# Patient Record
Sex: FEMALE | Race: WHITE | NOT HISPANIC OR LATINO | Employment: OTHER
[De-identification: names, ages, dates, MRNs, and addresses within clinical notes are randomized per-mention and may not be internally consistent; named-entity substitution may affect disease eponyms.]

---

## 2019-12-03 ENCOUNTER — TRANSCRIBE ORDERS (OUTPATIENT)
Dept: OTHER | Age: 84
End: 2019-12-03

## 2019-12-03 DIAGNOSIS — Z91.81 PERSONAL HISTORY OF FALL: ICD-10-CM

## 2019-12-03 DIAGNOSIS — R26.89 IMPAIRMENT OF BALANCE: Primary | ICD-10-CM

## 2019-12-04 NOTE — TELEPHONE ENCOUNTER
RECORDS RECEIVED FROM: External - Dr Ed Tyson    DATE RECEIVED: 1/15/20   NOTES (FOR ALL VISITS) STATUS DETAILS   OFFICE NOTE from referring provider Care Everywhere 11/25/19  11/15/19   OFFICE NOTE from other specialist N/A    DISCHARGE SUMMARY from hospital Care Everywhere [facility]:  10/17/18-10/19/18   DISCHARGE REPORT from the ER N/A    OPERATIVE REPORT N/A    MEDICATION LIST Care Everywhere    IMAGING  (FOR ALL VISITS)     EMG N/A    EEG N/A    ECT N/A    MRI (HEAD, NECK, SPINE) Received [facility]:  MRI Head 11/20/19  MRA Head 10/18/18  MRI Head 10/18/18  MRI Head 10/11/18   LUMBAR PUNCTURE N/A    BABAK Scan N/A    CT (HEAD, NECK, SPINE) Received [facility]:  CT Head 7/3/18      Action 12/4/19   Action Taken Imaging request faxed to [facility] for:  MRI Head 11/20/19  MRA Head 10/18/18  MRI Head 10/18/18  MRI Head 10/11/18  CT Head 7/3/18

## 2019-12-10 NOTE — TELEPHONE ENCOUNTER
Imaging Received  12/10/19  [facility]   Image Type (x): Disc:   PACS: x   Exam Date/Name MRI Head 11/20/19  MRA Head 10/18/18  MRI Head 10/18/18  MRI Head 10/11/18  CT Head 7/3/18 Comments: image resolved in PACS

## 2020-01-12 ASSESSMENT — ENCOUNTER SYMPTOMS
NIGHT SWEATS: 0
BLOOD IN STOOL: 0
SPEECH CHANGE: 0
JAUNDICE: 0
LIGHT-HEADEDNESS: 1
POLYPHAGIA: 1
DOUBLE VISION: 1
BOWEL INCONTINENCE: 0
WEIGHT LOSS: 0
CHILLS: 1
HEARTBURN: 1
TASTE DISTURBANCE: 0
NECK PAIN: 0
VOMITING: 0
NAUSEA: 0
TROUBLE SWALLOWING: 1
NUMBNESS: 0
SORE THROAT: 0
SINUS CONGESTION: 0
HEMATURIA: 0
SEIZURES: 0
SYNCOPE: 0
TREMORS: 0
LOSS OF CONSCIOUSNESS: 0
NECK MASS: 0
EYE WATERING: 0
SLEEP DISTURBANCES DUE TO BREATHING: 0
POLYDIPSIA: 1
EYE REDNESS: 0
ABDOMINAL PAIN: 0
LEG PAIN: 1
WEIGHT GAIN: 0
ORTHOPNEA: 0
HEADACHES: 1
HYPERTENSION: 1
PARALYSIS: 0
JOINT SWELLING: 1
PALPITATIONS: 0
WEAKNESS: 1
SINUS PAIN: 0
DIFFICULTY URINATING: 0
ALTERED TEMPERATURE REGULATION: 0
FEVER: 0
STIFFNESS: 1
HYPOTENSION: 0
BLOATING: 0
RECTAL PAIN: 0
DYSURIA: 0
CONSTIPATION: 0
MUSCLE CRAMPS: 0
FATIGUE: 1
INCREASED ENERGY: 0
DECREASED APPETITE: 0
ARTHRALGIAS: 1
HOARSE VOICE: 1
FLANK PAIN: 0
BACK PAIN: 1
DIARRHEA: 0
EYE IRRITATION: 1
DIZZINESS: 1
HALLUCINATIONS: 0
MEMORY LOSS: 0
EXERCISE INTOLERANCE: 0
MYALGIAS: 1
EYE PAIN: 0
DISTURBANCES IN COORDINATION: 0
SMELL DISTURBANCE: 0
MUSCLE WEAKNESS: 1
TINGLING: 0

## 2020-01-15 ENCOUNTER — OFFICE VISIT (OUTPATIENT)
Dept: NEUROLOGY | Facility: CLINIC | Age: 85
End: 2020-01-15
Attending: PSYCHIATRY & NEUROLOGY
Payer: MEDICARE

## 2020-01-15 ENCOUNTER — PRE VISIT (OUTPATIENT)
Dept: NEUROLOGY | Facility: CLINIC | Age: 85
End: 2020-01-15

## 2020-01-15 VITALS — SYSTOLIC BLOOD PRESSURE: 166 MMHG | DIASTOLIC BLOOD PRESSURE: 92 MMHG | WEIGHT: 129.6 LBS | HEART RATE: 73 BPM

## 2020-01-15 DIAGNOSIS — G20.A1 PARKINSON DISEASE (H): Primary | ICD-10-CM

## 2020-01-15 RX ORDER — LISINOPRIL 30 MG/1
TABLET ORAL DAILY
COMMUNITY

## 2020-01-15 RX ORDER — VENLAFAXINE HYDROCHLORIDE 150 MG/1
150 CAPSULE, EXTENDED RELEASE ORAL DAILY
COMMUNITY
Start: 2016-10-14

## 2020-01-15 RX ORDER — LEVOTHYROXINE SODIUM 75 UG/1
75 TABLET ORAL DAILY
COMMUNITY
Start: 2019-09-30

## 2020-01-15 RX ORDER — PANTOPRAZOLE SODIUM 40 MG/1
TABLET, DELAYED RELEASE ORAL DAILY
COMMUNITY
Start: 2019-12-26

## 2020-01-15 RX ORDER — CARBIDOPA AND LEVODOPA 25; 100 MG/1; MG/1
1.5 TABLET ORAL 3 TIMES DAILY
Qty: 150 TABLET | Refills: 3 | Status: SHIPPED | OUTPATIENT
Start: 2020-01-15 | End: 2020-01-28 | Stop reason: SINTOL

## 2020-01-15 RX ORDER — SIMVASTATIN 40 MG
60 TABLET ORAL AT BEDTIME
COMMUNITY
Start: 2016-10-14

## 2020-01-15 ASSESSMENT — UNIFIED PARKINSONS DISEASE RATING SCALE (UPDRS)
TOTAL_SCORE: 7
SPONTANEITY_OF_MOVEMENT: 1: SLIGHT: SLIGHT GLOBAL SLOWNESS AND POVERTY OF SPONTANEOUS MOVEMENTS.
POSTURE: 2 MILD.  DEFINITE FLEXION, SCOLIOSIS OR LEANING OT ONE SIDE, BUT CAN CORRECT POSTURE TO NORMAL WHEN ASKED.
AMPLITUDE_RLE: NORMAL: NO TREMOR.
AMPLITUDE_LUE: NORMAL: NO TREMOR.
RIGIDITY_NECK: NORMAL
AMPLITUDE_RUE: NORMAL: NO TREMOR.
RIGIDITY_LUE: MILD: RIGIDITY DETECTED WITHOUT THE ACTIVATION MANEUVER.  FULL RANGE OF MOTION IS EASILY ACHIEVED.
GAIT: SLIGHT: INDEPENDENT WALKING WITH MINOR GAIT IMPAIRMENT.
AXIAL_SCORE: 12
FINGER_TAPPING_RIGHT: SLIGHT: ANY OF THE FOLLOWING: A) THE REGULAR RHYTHM IS BROKEN WITH ONE WITH ONE OR TWO INTERRUPTIONS OR HESITATIONS OF THE MOVEMENT B) SLIGHT SLOWING C) THE AMPLITUDE DECREMENTS NEAR THE END OF THE 10 MOVEMENTS.
SPEECH: SLIGHT: LOSS OF MODULATION, DICTION OR VOLUME, BUT STILL ALL WORDS EASY TO UNDERSTAND.
FREEZING_GAIT: SLIGHT:  FREEZES ON STARTING, TURNING, OR WALKING THROUGH DOOWAY WITH A SINGLE HALT DURING ANY OF THESE EVENTS, BUT THEN CONTINUES SMOOTHLY WITHOUT FREEZING DURING STRAIGHT WALKING.
PARKINSONS_MEDS: OFF
CONSTANCY_TREMOR_ATREST: NORMAL: NO TREMOR.
HANDMOVEMENTS_LEFT: SLIGHT: ANY OF THE FOLLOWING: A) THE REGULAR RHYTHM IS BROKEN WITH ONE WITH ONE OR TWO INTERRUPTIONS OR HESITATIONS OF THE MOVEMENT B) SLIGHT SLOWING C) THE AMPLITUDE DECREMENTS NEAR THE END OF THE 10 MOVEMENTS.
PRONATION_SUPINATION_RIGHT: SLIGHT: ANY OF THE FOLLOWING: A) THE REGULAR RHYTHM IS BROKEN WITH ONE WITH ONE OR TWO INTERRUPTIONS OR HESITATIONS OF THE MOVEMENT B) SLIGHT SLOWING C) THE AMPLITUDE DECREMENTS NEAR THE END OF THE 10 MOVEMENTS.
RIGIDITY_LLE: NORMAL
FINGER_TAPPING_LEFT: SLIGHT: ANY OF THE FOLLOWING: A) THE REGULAR RHYTHM IS BROKEN WITH ONE WITH ONE OR TWO INTERRUPTIONS OR HESITATIONS OF THE MOVEMENT B) SLIGHT SLOWING C) THE AMPLITUDE DECREMENTS NEAR THE END OF THE 10 MOVEMENTS.
LEG_AGILITY_LEFT: SLIGHT: ANY OF THE FOLLOWING: A) THE REGULAR RHYTHM IS BROKEN WITH ONE WITH ONE OR TWO INTERRUPTIONS OR HESITATIONS OF THE MOVEMENT B) SLIGHT SLOWING C) THE AMPLITUDE DECREMENTS NEAR THE END OF THE 10 MOVEMENTS.
TOTAL_SCORE_LEFT: 8
FACIAL_EXPRESSION: SLIGHT: MINIMAL MASKED FACIES MANIFESTED ONLY BY DECREASED FREQUENCY OF BLINKING.
POSTURAL_STABILITY: SEVERE: VERY UNSTABLE, TENDS TO LOSE BALANCE SPONTANEOUSLY OR WITH JUST A GENTLE PULL ON THE SHOULDERS.
PRONATION_SUPINATION_LEFT: SLIGHT: ANY OF THE FOLLOWING: A) THE REGULAR RHYTHM IS BROKEN WITH ONE WITH ONE OR TWO INTERRUPTIONS OR HESITATIONS OF THE MOVEMENT B) SLIGHT SLOWING C) THE AMPLITUDE DECREMENTS NEAR THE END OF THE 10 MOVEMENTS.
AMPLITUDE_LIP_JAW: NORMAL: NO TREMOR.
LEG_AGILITY_RIGHT: SLIGHT: ANY OF THE FOLLOWING: A) THE REGULAR RHYTHM IS BROKEN WITH ONE WITH ONE OR TWO INTERRUPTIONS OR HESITATIONS OF THE MOVEMENT B) SLIGHT SLOWING C) THE AMPLITUDE DECREMENTS NEAR THE END OF THE 10 MOVEMENTS.
HANDMOVEMENTS_RIGHT: SLIGHT: ANY OF THE FOLLOWING: A) THE REGULAR RHYTHM IS BROKEN WITH ONE WITH ONE OR TWO INTERRUPTIONS OR HESITATIONS OF THE MOVEMENT B) SLIGHT SLOWING C) THE AMPLITUDE DECREMENTS NEAR THE END OF THE 10 MOVEMENTS.
RIGIDITY_RUE: MILD: RIGIDITY DETECTED WITHOUT THE ACTIVATION MANEUVER.  FULL RANGE OF MOTION IS EASILY ACHIEVED.
TOETAPPING_RIGHT: SLIGHT: ANY OF THE FOLLOWING: A) THE REGULAR RHYTHM IS BROKEN WITH ONE WITH ONE OR TWO INTERRUPTIONS OR HESITATIONS OF THE MOVEMENT B) SLIGHT SLOWING C) THE AMPLITUDE DECREMENTS NEAR THE END OF THE 10 MOVEMENTS.
RIGIDITY_RLE: NORMAL
ARISING_CHAIR: SLIGHT: ARISING IS SLOWER THAN NORMAL, OR MAY NEED MORE THAN ONE ATTEMPT, OR MAY NEED TO MOVE FORWARD IN THE CHAIR TO ARISE.  NO NEED TO USE THE ARMS OF THE CHAIR.
TOETAPPING_LEFT: MILD: ANY OF THE FOLLOWING: A) 3 TO 5 INTERRUPTIONS DURING TAPPING B) MILD SLOWING C) THE AMPLITUDE DECREMENTS MIDWAY IN THE 10-MOVEMENT SEQUENCE
TOTAL_SCORE: 27
AMPLITUDE_LLE: NORMAL: NO TREMOR.

## 2020-01-15 ASSESSMENT — PAIN SCALES - GENERAL: PAINLEVEL: NO PAIN (0)

## 2020-01-15 NOTE — PATIENT INSTRUCTIONS
1.Start Sinemet (carbidopa/levodopa) 25/100 mg as treatment for Parkinson's disease to goal dose of 1.5 tabs three times a day   - Common Side Effects discussed including: dizziness, nausea, constipation   - Take Sinemet one hour before a meal time or two hours after a meal     - Use the following schedule to start Sinemet  Initial Treatment:  Sinemet 25/100 mg       Morning Noon  Evening  Week 1-2 Half tab Half tab Half tab  Week 3-4 One tab One tab One tab  Week 5-6 1.5 tab  1.5 tab  1.5 tab   - If nausea or vomiting should occur, do not discontinue taking your medication and try taking with crackers or a non-protein snack.    2. Avoid falls .  3. Ask your PCP for a barium swallow study to be completed.  4. Return to clinic in 2 months.

## 2020-01-15 NOTE — PROGRESS NOTES
Department of Neurology  Movement Disorders Division     Patient: Alba Lara   MRN: [id number]   : 1935   Date of Visit: 1/15/2020     Reason for visit: parkinsonism     Referring Physician: Dr. Tyson     History of Present Illness  Ms. Lara is a 84 year old R handed female with PMH of hypertension, thyroid disease, hyperlipidemia who presents to neurology clinic is being referred by Dr. 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  Patient is accompanied by one of her daughters and son-in-law.    Patient was in her usual state of health until 3 years ago when she noted double vision, she was seen by the optometrist and and was referred to neurologist for further evaluation.  Patient states that she is having intermittent double vision, now wearing prism glasses.  Also significant for falls forward  and backward.  She has a tendency to lose her balance without any warnings.  She sustained a fall 3 weeks ago when she lost her balance in the kitchen.  She has been having falls at least once a month.    Also she noted a change in her gait.  She is walking slower and gait change has been progressing over the last 3 years, she describes a festinating gait when she takes frequent short steps and is unable to stop until she falls.    Patient is denying any memory problems.  She is driving, she resides  in her own place and she is independent with all activities of daily living.  She has a two-story house with a basement , she is denying falls while taking stairs.    Alba is denying tremors.  She has some difficulty swallowing, no choking.  Family is denying any change in her mood, no pseudobulbar affect.  She states at times she has difficulty opening her eyes.    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    She has a good sense of smell.    Family thinks she has a  "change in her facial expression with \"a surprised face\".  She is denying dream enactment behavior and no difficulty breathing during sleep.    Review of Systems:  Other than that mentioned above, the remainder of 12 systems reviewed were negative.    Medications:  No current outpatient medications on file.     Vitamin D 25 1000 units daily  Vitamin B12 1000 MCG daily  Synthroid 75 MCG daily  Simvastatin 60 mg at bedtime  venlafaxine 150 mg daily  Lisinopril 30 mg daily  Pantoprazole EC 40 mg daily     Physical Exam:  BP (!) 166/92 (BP Location: Left arm, Patient Position: Chair, Cuff Size: Adult Large)   Pulse 73   Wt 58.8 kg (129 lb 9.6 oz)    Alert and oriented to person, place and time, provides details of medical history, follows commands.  CN: almost absent upgaze and reduced down gaze , saccadic eye movements R>L ,no nystagmus, no square wave jerks, face is symmetric at rest and with activation , tongue protrudes to midline.  Motor exam: no abnormal movements, no resting tremor , no axial rigidity in the neck appreciated, mild BL rigidity at the wrists , slight symmetric bradykinesias  with finger tapping, hand movements, no kinetic tremor .    UPDRS Values 1/15/2020   Time: 4:20 PM   Medication Off   R Brain DBS: None   L Brain DBS: None   Speech 1   Facial Expression 1   Rigidity Neck 0   Rigidity RUE 2   Rigidity LUE 2   Rigidity RLE 0   Rigidity LLE 0   Finger Taps R 1   Finger Taps L 1   Hand Mvt R 1   Hand Mvt L 1   Pron-/Supinate R 1   Pron-/Supinate L 1   Toe Tap R 1   Toe Tap L 2   Leg Agility R 1   Leg Agility L 1   Arise From Chair 1   Gait 1   Gait Freezing 1   Postural Stability 4   Posture 2   Global Spont Mvt 1   Postural Tremor RUE 0   Postural Tremor LUE 0   Kinetic Tremor RUE 0   Kinetic Tremor LUE 0   Rest Tremor RUE 0   Rest Tremor LUE 0   Rest Tremor RLE 0   Rest Tremor LLE 0   Rest Tremor Lip/Jaw 0   Rest Tremor Constancy 0   Total Right 7   Total Left 8   Axial Total 12   Total 27 "   Sensation is intact to light touch, proprioception intact in bilateral big toes, vibration is 8-9 in bilateral big toes.  Coordination: no dysmetria on FTN .  Reflexes: UE 2+, LE -1+, trace AJ.  Gait: small steps, hesitation on turns,  leans to the right , does not swing arms when walking.  Balance: spontaneous loss of balance with gentle pull .    Data Reviewed:   MRI brain: images reviewed moderate atrophy with nonspecific white matter T2 signal hyperintensities.     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  Patient did not have a dopamine challenge to see if her symptoms improve.    Plan:   Start Sinemet (carbidopa/levodopa) 25/100 mg as treatment for Parkinson's disease to goal dose of 1.5 tabs three times a day   - Common Side Effects discussed including: dizziness, nausea, constipation   - Take Sinemet one hour before a meal time or two hours after a meal     - Use the following schedule to start Sinemet  Initial Treatment:  Sinemet 25/100 mg       Morning Noon  Evening  Week 1-2 Half tab Half tab Half tab  Week 3-4 One tab One tab One tab  Week 5-6 1.5 tab  1.5 tab  1.5 tab   - If nausea or vomiting should occur, do not discontinue taking your medication and try taking with crackers or a non-protein snack.    2. Avoid falls at any cost. We discussed about safety ,she will require living adjustments to avoid stairs .    3. She was advised to ask her PCP for a barium swallow study.      RTC: 2 months     Belen Antunez MD   Movement Disorders Fellow    Patient seen and discussed with Dr. Rothman.    I, Cj Rothman MD, personally interviewed, examined and evaluated this patient on 1/15/2020.  I discussed the patient with Dr. Belen Antunez and agree with the assessment, examination and plan of care documented by Dr. Antunez.  I personally reviewed the vital signs,  medications and labs/imaging.    I agree with Dr. Antunez.  This patient has a parkinsonian syndrome.  It was characterized by early ocular motor symptoms and by falls.  On examination she has clear parkinsonism.  She does have mild downgaze paralysis.  She also has rigidity of the legs and generalized bradykinesia.  Her postural reflexes are extremely poor.  As Dr. 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  We do have to document levodopa unresponsiveness.  We are going to start her on levodopa and escalate the dosage and see if she has any response.  We will see her back.  She does express some difficulty with swallowing and we have asked her to contact her PCP to arrange a swallowing study.  We agree with Dr. 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  If this diagnosis is confirmed we will look for clinical trials that might be appropriate for her.    Cj Rothman MD      Summary of orders placed this encounter:  No orders of the defined types were placed in this encounter.

## 2020-01-15 NOTE — LETTER
1/15/2020       RE: Alba Lara  [address]  [place] [address]     Dear Colleague,    Thank you for referring your patient, Alba Lara, to the [facility] at [facility]. Please see a copy of my visit note below.    Department of Neurology  Movement Disorders Division     Patient: Alba Lara   MRN: [id number]   : 1935   Date of Visit: 1/15/2020     Reason for visit: parkinsonism     Referring Physician: Dr. Tyson     History of Present Illness  Ms. Lara is a 84 year old R handed female with PMH of hypertension, thyroid disease, hyperlipidemia who presents to neurology clinic is being referred by Dr. 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  Patient is accompanied by one of her daughters and son-in-law.    Patient was in her usual state of health until 3 years ago when she noted double vision, she was seen by the optometrist and and was referred to neurologist for further evaluation.  Patient states that she is having intermittent double vision, now wearing prism glasses.  Also significant for falls forward  and backward.  She has a tendency to lose her balance without any warnings.  She sustained a fall 3 weeks ago when she lost her balance in the kitchen.  She has been having falls at least once a month.    Also she noted a change in her gait.  She is walking slower and gait change has been progressing over the last 3 years, she describes a festinating gait when she takes frequent short steps and is unable to stop until she falls.    Patient is denying any memory problems.  She is driving, she resides  in her own place and she is independent with all activities of daily living.  She has a two-story house with a basement , she is denying falls while taking stairs.    Alba is denying tremors.  She has some difficulty swallowing, no choking.  Family is denying any change in her mood, no  "pseudobulbar affect.  She states at times she has difficulty opening her eyes.    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    She has a good sense of smell.    Family thinks she has a change in her facial expression with \"a surprised face\".  She is denying dream enactment behavior and no difficulty breathing during sleep.    Review of Systems:  Other than that mentioned above, the remainder of 12 systems reviewed were negative.    Medications:  No current outpatient medications on file.     Vitamin D 25 1000 units daily  Vitamin B12 1000 MCG daily  Synthroid 75 MCG daily  Simvastatin 60 mg at bedtime  venlafaxine 150 mg daily  Lisinopril 30 mg daily  Pantoprazole EC 40 mg daily     Physical Exam:  BP (!) 166/92 (BP Location: Left arm, Patient Position: Chair, Cuff Size: Adult Large)   Pulse 73   Wt 58.8 kg (129 lb 9.6 oz)    Alert and oriented to person, place and time, provides details of medical history, follows commands.  CN: almost absent upgaze and reduced down gaze , saccadic eye movements R>L ,no nystagmus, no square wave jerks, face is symmetric at rest and with activation , tongue protrudes to midline.  Motor exam: no abnormal movements, no resting tremor , no axial rigidity in the neck appreciated, mild BL rigidity at the wrists , slight symmetric bradykinesias  with finger tapping, hand movements, no kinetic tremor .    UPDRS Values 1/15/2020   Time: 4:20 PM   Medication Off   R Brain DBS: None   L Brain DBS: None   Speech 1   Facial Expression 1   Rigidity Neck 0   Rigidity RUE 2   Rigidity LUE 2   Rigidity RLE 0   Rigidity LLE 0   Finger Taps R 1   Finger Taps L 1   Hand Mvt R 1   Hand Mvt L 1   Pron-/Supinate R 1   Pron-/Supinate L 1   Toe Tap R 1   Toe Tap L 2   Leg Agility R 1   Leg Agility L 1   Arise From Chair 1   Gait 1   Gait Freezing 1   Postural Stability 4   Posture 2   Global Spont Mvt 1   Postural " Tremor RUE 0   Postural Tremor LUE 0   Kinetic Tremor RUE 0   Kinetic Tremor LUE 0   Rest Tremor RUE 0   Rest Tremor LUE 0   Rest Tremor RLE 0   Rest Tremor LLE 0   Rest Tremor Lip/Jaw 0   Rest Tremor Constancy 0   Total Right 7   Total Left 8   Axial Total 12   Total 27   Sensation is intact to light touch, proprioception intact in bilateral big toes, vibration is 8-9 in bilateral big toes.  Coordination: no dysmetria on FTN .  Reflexes: UE 2+, LE -1+, trace AJ.  Gait: small steps, hesitation on turns,  leans to the right , does not swing arms when walking.  Balance: spontaneous loss of balance with gentle pull .    Data Reviewed:   MRI brain: images reviewed moderate atrophy with nonspecific white matter T2 signal hyperintensities.     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  Patient did not have a dopamine challenge to see if her symptoms improve.    Plan:   Start Sinemet (carbidopa/levodopa) 25/100 mg as treatment for Parkinson's disease to goal dose of 1.5 tabs three times a day   - Common Side Effects discussed including: dizziness, nausea, constipation   - Take Sinemet one hour before a meal time or two hours after a meal     - Use the following schedule to start Sinemet  Initial Treatment:  Sinemet 25/100 mg       Morning Noon  Evening  Week 1-2 Half tab Half tab Half tab  Week 3-4 One tab One tab One tab  Week 5-6 1.5 tab  1.5 tab  1.5 tab   - If nausea or vomiting should occur, do not discontinue taking your medication and try taking with crackers or a non-protein snack.    2. Avoid falls at any cost. We discussed about safety ,she will require living adjustments to avoid stairs .    3. She was advised to ask her PCP for a barium swallow study.    RTC: 2 months     Belen Antunez MD   Movement Disorders Fellow    Patient seen and discussed with Dr. Rothman.    I,  Cj Rothman MD, personally interviewed, examined and evaluated this patient on 1/15/2020.  I discussed the patient with Dr. Belen Antunez and agree with the assessment, examination and plan of care documented by Dr. Antunez.  I personally reviewed the vital signs, medications and labs/imaging.    I agree with Dr. Antunez.  This patient has a parkinsonian syndrome.  It was characterized by early ocular motor symptoms and by falls.  On examination she has clear parkinsonism.  She does have mild downgaze paralysis.  She also has rigidity of the legs and generalized bradykinesia.  Her postural reflexes are extremely poor.  As Dr. 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  We do have to document levodopa unresponsiveness.  We are going to start her on levodopa and escalate the dosage and see if she has any response.  We will see her back.  She does express some difficulty with swallowing and we have asked her to contact her PCP to arrange a swallowing study.  We agree with Dr. 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  If this diagnosis is confirmed we will look for clinical trials that might be appropriate for her.    Cj Rothman MD    Summary of orders placed this encounter:  No orders of the defined types were placed in this encounter.

## 2020-01-20 ENCOUNTER — TELEPHONE (OUTPATIENT)
Dept: NEUROLOGY | Facility: CLINIC | Age: 85
End: 2020-01-20

## 2020-01-20 NOTE — TELEPHONE ENCOUNTER
Called patient , she has had some nausea a few days ago when started 1/2 tab CD/LD , but as of today she is not c/o nausea and is able to tolerate the medication.     [facility] Call Center    Phone Message    May a detailed message be left on voicemail: no    Reason for Call: Other: Alba Fox calling back- Please reach out to her at: [phone]     Action Taken: Other: Neurology

## 2020-01-28 ENCOUNTER — TELEPHONE (OUTPATIENT)
Dept: NEUROLOGY | Facility: CLINIC | Age: 85
End: 2020-01-28

## 2020-01-28 NOTE — TELEPHONE ENCOUNTER
Received this message from Dr. Rothman:    Cj Rothman MD Vold, Susan E., RN   Caller: Unspecified (Today, 11:01 AM)             She can stop the medication      I told the patient she can stop taking the carbidopa/levodopa. She appreciated the call. Med list updated.

## 2020-01-28 NOTE — TELEPHONE ENCOUNTER
"Situation:    Patient called clinic due to a new symptom of tiredness. She is more tired since starting carbidopa/levodopa.  She states her legs seem stiff, \"I don't feel comfortable anymore.\" She also believes she is falling more.    Background:    carbidopa-levodopa (SINEMET)  MG tablet 150 tablet 3 1/15/2020  --   Sig - Route: Take 1.5 tablets by mouth 3 times daily - Oral     On 1/20/20 she had a conversation with Marisa VILLARREAL RN and stated she was able to tolerate the medication. She is currently at a half tab three times per day.     It appears from the note dated 1/15/20 that there is concern for psp.  Morning           Noon                Evening  Week 1-2         Half tab            Half tab            Half tab  Week 3-4         One tab           One tab           One tab  Week 5-6         1.5 tab             1.5 tab             1.5 tab    If nausea or vomiting should occur, do not discontinue taking your medication and try taking with crackers or a non-protein snack.    Assessment:    According to the calendar, on Feb 6 she will be going to 1 tab three times per day.    Recommendation:    I advised her to continue taking the medication as directed as sometimes the sleepiness can improve. I will alert Dr. Antunez of this and she may want to call the patient.      "

## 2020-01-28 NOTE — TELEPHONE ENCOUNTER
[facility] Call [facility]    Phone Message    May a detailed message be left on voicemail: yes    Reason for Call: Symptoms or Concerns     If patient has red-flag symptoms, warm transfer to triage line    Current symptom or concern: falling backwards, tired,     Symptoms have been present for:  1-2 week(s)    Has patient previously been seen for this? Yes    By : Dr. Rothman        Are there any new or worsening symptoms? Yes: Patient called stating that ever since she has been taking Sinemet, she states she has been falling backwards and is very tired, Patient states she is going to the bank but will be back home by 1130 for a return call to discuss her symptoms. Thank you.       Action Taken: Message routed to:  [facility] ([facility] neurology

## 2020-03-11 ENCOUNTER — HEALTH MAINTENANCE LETTER (OUTPATIENT)
Age: 85
End: 2020-03-11

## 2020-04-30 ENCOUNTER — TELEPHONE (OUTPATIENT)
Dept: NEUROLOGY | Facility: CLINIC | Age: 85
End: 2020-04-30

## 2020-04-30 NOTE — TELEPHONE ENCOUNTER
Received ARMANDO from [facility]  Records Date of service: 3/2/20  Copy has been sent to scanning and encounter routed to specialty nurse for review.

## 2021-01-04 ENCOUNTER — HEALTH MAINTENANCE LETTER (OUTPATIENT)
Age: 86
End: 2021-01-04

## 2021-04-25 ENCOUNTER — HEALTH MAINTENANCE LETTER (OUTPATIENT)
Age: 86
End: 2021-04-25

## 2021-10-10 ENCOUNTER — HEALTH MAINTENANCE LETTER (OUTPATIENT)
Age: 86
End: 2021-10-10

## 2022-05-22 ENCOUNTER — HEALTH MAINTENANCE LETTER (OUTPATIENT)
Age: 87
End: 2022-05-22

## 2022-09-18 ENCOUNTER — HEALTH MAINTENANCE LETTER (OUTPATIENT)
Age: 87
End: 2022-09-18

## 2023-06-04 ENCOUNTER — HEALTH MAINTENANCE LETTER (OUTPATIENT)
Age: 88
End: 2023-06-04